# Patient Record
Sex: FEMALE | Race: WHITE
[De-identification: names, ages, dates, MRNs, and addresses within clinical notes are randomized per-mention and may not be internally consistent; named-entity substitution may affect disease eponyms.]

---

## 2017-07-17 ENCOUNTER — HOSPITAL ENCOUNTER (OUTPATIENT)
Dept: HOSPITAL 62 - RAD | Age: 72
End: 2017-07-17
Attending: INTERNAL MEDICINE
Payer: MEDICARE

## 2017-07-17 DIAGNOSIS — M25.552: Primary | ICD-10-CM

## 2017-07-17 NOTE — RADIOLOGY REPORT (SQ)
EXAM DESCRIPTION:  MRIRLJ WO



COMPLETED DATE/TIME:  7/17/2017 9:58 am



REASON FOR STUDY:  RIGHT HIP PAIN (M25.552)



COMPARISON:  No comparison imaging available.  Correlation with hip radiographs is still recommended 
if not previously performed.



TECHNIQUE:  Noncontrast multiplanar MR imaging.  Sequences include wide field of view pelvis and focu
sed hip of interest.  Fat sensitive, water sensitive, and cartilage sensitive sequences.

Specific hip of interest: Right



LIMITATIONS:  None.



FINDINGS:  MARROW SIGNAL: Normal, no evidence of replacement, occult fracture or suspicious bone lesi
on in the visualized lumbar spine, pelvis and proximal femurs.

SPECIFIC HIP OF INTEREST:  No effusion. Appropriate acetabular coverage. Normal sphericity of the fem
oral head.  No avascular necrosis or reactive marrow changes.  Inter trochanteric tiny lobulated hype
rintense T2 focus measures just at 1 cm and has no aggressive features.  Probable enchondroma or bony
fact.  Hyaline cartillage preserved and labrum intact as assessed.  No paralabral cyst.  No regional 
mass, bursitis or muscle tear.

OPPOSITE HIP: Normal.  No effusion or other significant finding on limited sequences.

REMAINDER OF THE OSSEOUS PELVIS: SI joints normal.  Symphasis pubis intact.  No Avulsion injury evide
nt.

INTRA- AND EXTRAPELVIC SOFT TISSUES: No intrapelvic mass or free fluid. Bladder normal.  No extrapelv
ic mass.  No inguinal hernia or adenopathy.



IMPRESSION:  1.  No acute or suspicious pelvic or right hip abnormality appreciated.  The hip looks r
elatively well preserved.  If not performed previously, radiographs of the right hip are still recomm
ended for evaluation.

## 2019-04-09 ENCOUNTER — HOSPITAL ENCOUNTER (OUTPATIENT)
Dept: HOSPITAL 62 - RAD | Age: 74
End: 2019-04-09
Attending: OTOLARYNGOLOGY
Payer: MEDICARE

## 2019-04-09 DIAGNOSIS — R59.1: Primary | ICD-10-CM

## 2019-04-09 PROCEDURE — 60100 BIOPSY OF THYROID: CPT

## 2019-04-09 PROCEDURE — 88305 TISSUE EXAM BY PATHOLOGIST: CPT

## 2019-04-09 NOTE — RADIOLOGY REPORT (SQ)
EXAM DESCRIPTION:  U/S BIOPSY THYROID



COMPLETED DATE/TIME:  4/9/2019 2:14 pm



REASON FOR STUDY:  LYMPHADENOPATHY (R59.1) R59.1  GENERALIZED ENLARGED LYMPH NODES



COMPARISON:   CT soft tissue neck 1/28/2019

Ultrasound soft tissue neck 3/25/2019



TECHNIQUE:  Scanning today demonstrates a persistent 1.5 x 1.4 x 0.6 cm left level IIa lymph node (wa
s 2.8 x 1.1 x 0.8 cm on outside neck ultrasound 3/25/2019).  This is the lymph node targeted for fine
 needle aspirate today.

The procedure was discussed with the patient and written informed consent obtained.  A timeout was pe
rformed to confirm the procedure and patient's identity.  The skin of the neck was prepped and draped
 in sterile fashion and 1.5 mL of 1% lidocaine administered for local anesthesia.  Under sonographic 
guidance, fine needle aspiration biopsy was performed of the left level IIa lymph node.

Two separate aspirations were performed.  Hemostasis was obtained with direct manual compression.  Th
ere were no immediate complications.



LIMITATIONS:  None.



FINDINGS:  PATHOLOGY: Pending.



IMPRESSION:  ULTRASOUND-GUIDED BIOPSY PERFORMED OF A PERSISTENT LEFT LEVEL IIA ENLARGED LYMPH NODE.  
PATHOLOGY PENDING AT THE TIME OF DICTATION.  NO IMMEDIATE POSTPROCEDURE COMPLICATION.



COMMENT:  Patient medication list reviewed: Yes- Quality ID# [id number]:Eligible professional attests to doc
umenting in the medical record they obtained, updated, or reviewed the patient's current medications.




TECHNICAL DOCUMENTATION:  JOB ID:  [id number]

 2011 [employer]- All Rights Reserved



Reading location - IP/workstation name: [email or web address]

## 2019-11-13 ENCOUNTER — HOSPITAL ENCOUNTER (OUTPATIENT)
Dept: HOSPITAL 62 - ER | Age: 74
Setting detail: OBSERVATION
LOS: 1 days | Discharge: HOME | End: 2019-11-14
Attending: INTERNAL MEDICINE | Admitting: INTERNAL MEDICINE
Payer: MEDICARE

## 2019-11-13 DIAGNOSIS — R53.1: ICD-10-CM

## 2019-11-13 DIAGNOSIS — Z91.19: ICD-10-CM

## 2019-11-13 DIAGNOSIS — R42: ICD-10-CM

## 2019-11-13 DIAGNOSIS — R06.03: ICD-10-CM

## 2019-11-13 DIAGNOSIS — E78.5: ICD-10-CM

## 2019-11-13 DIAGNOSIS — J43.9: ICD-10-CM

## 2019-11-13 DIAGNOSIS — R09.02: ICD-10-CM

## 2019-11-13 DIAGNOSIS — R05: ICD-10-CM

## 2019-11-13 DIAGNOSIS — Z87.891: ICD-10-CM

## 2019-11-13 DIAGNOSIS — E11.9: ICD-10-CM

## 2019-11-13 DIAGNOSIS — I10: ICD-10-CM

## 2019-11-13 DIAGNOSIS — R63.5: ICD-10-CM

## 2019-11-13 DIAGNOSIS — J81.1: Primary | ICD-10-CM

## 2019-11-13 LAB
ABSOLUTE LYMPHOCYTES# (MANUAL): 1.9 10^3/UL (ref 0.5–4.7)
ABSOLUTE MONOCYTES # (MANUAL): 0.3 10^3/UL (ref 0.1–1.4)
ADD MANUAL DIFF: YES
ALBUMIN SERPL-MCNC: 4.5 G/DL (ref 3.5–5)
ALP SERPL-CCNC: 58 U/L (ref 38–126)
ANION GAP SERPL CALC-SCNC: 12 MMOL/L (ref 5–19)
APPEARANCE UR: CLEAR
APTT PPP: YELLOW S
AST SERPL-CCNC: 28 U/L (ref 14–36)
BASOPHILS NFR BLD MANUAL: 2 % (ref 0–2)
BILIRUB DIRECT SERPL-MCNC: 0.2 MG/DL (ref 0–0.4)
BILIRUB SERPL-MCNC: 1 MG/DL (ref 0.2–1.3)
BILIRUB UR QL STRIP: NEGATIVE
BUN SERPL-MCNC: 10 MG/DL (ref 7–20)
CALCIUM: 9.4 MG/DL (ref 8.4–10.2)
CHLORIDE SERPL-SCNC: 104 MMOL/L (ref 98–107)
CO2 SERPL-SCNC: 28 MMOL/L (ref 22–30)
EOSINOPHIL NFR BLD MANUAL: 3 % (ref 0–6)
ERYTHROCYTE [DISTWIDTH] IN BLOOD BY AUTOMATED COUNT: 13.3 % (ref 11.5–14)
GLUCOSE SERPL-MCNC: 162 MG/DL (ref 75–110)
GLUCOSE UR STRIP-MCNC: NEGATIVE MG/DL
HCT VFR BLD CALC: 42.8 % (ref 36–47)
HGB BLD-MCNC: 15 G/DL (ref 12–15.5)
KETONES UR STRIP-MCNC: NEGATIVE MG/DL
MCH RBC QN AUTO: 31 PG (ref 27–33.4)
MCHC RBC AUTO-ENTMCNC: 34.9 G/DL (ref 32–36)
MCV RBC AUTO: 89 FL (ref 80–97)
MONOCYTES % (MANUAL): 3 % (ref 3–13)
NEUTS BAND NFR BLD MANUAL: 1 % (ref 3–5)
NITRITE UR QL STRIP: NEGATIVE
PH UR STRIP: 7 [PH] (ref 5–9)
PLATELET # BLD: 357 10^3/UL (ref 150–450)
PLATELET COMMENT: ADEQUATE
POLYCHROMASIA BLD QL SMEAR: SLIGHT
POTASSIUM SERPL-SCNC: 4.1 MMOL/L (ref 3.6–5)
PROT SERPL-MCNC: 7.9 G/DL (ref 6.3–8.2)
PROT UR STRIP-MCNC: NEGATIVE MG/DL
RBC # BLD AUTO: 4.84 10^6/UL (ref 3.72–5.28)
SEGMENTED NEUTROPHILS % (MAN): 69 % (ref 42–78)
SP GR UR STRIP: 1.01
TOTAL CELLS COUNTED BLD: 100
UROBILINOGEN UR-MCNC: 2 MG/DL (ref ?–2)
VARIANT LYMPHS NFR BLD MANUAL: 18 % (ref 13–45)
WBC # BLD AUTO: 8.8 10^3/UL (ref 4–10.5)

## 2019-11-13 PROCEDURE — 96375 TX/PRO/DX INJ NEW DRUG ADDON: CPT

## 2019-11-13 PROCEDURE — 81001 URINALYSIS AUTO W/SCOPE: CPT

## 2019-11-13 PROCEDURE — 99285 EMERGENCY DEPT VISIT HI MDM: CPT

## 2019-11-13 PROCEDURE — 71275 CT ANGIOGRAPHY CHEST: CPT

## 2019-11-13 PROCEDURE — 80048 BASIC METABOLIC PNL TOTAL CA: CPT

## 2019-11-13 PROCEDURE — 82962 GLUCOSE BLOOD TEST: CPT

## 2019-11-13 PROCEDURE — 84443 ASSAY THYROID STIM HORMONE: CPT

## 2019-11-13 PROCEDURE — 80053 COMPREHEN METABOLIC PANEL: CPT

## 2019-11-13 PROCEDURE — 94660 CPAP INITIATION&MGMT: CPT

## 2019-11-13 PROCEDURE — 83735 ASSAY OF MAGNESIUM: CPT

## 2019-11-13 PROCEDURE — 87086 URINE CULTURE/COLONY COUNT: CPT

## 2019-11-13 PROCEDURE — 70450 CT HEAD/BRAIN W/O DYE: CPT

## 2019-11-13 PROCEDURE — 96374 THER/PROPH/DIAG INJ IV PUSH: CPT

## 2019-11-13 PROCEDURE — G0378 HOSPITAL OBSERVATION PER HR: HCPCS

## 2019-11-13 PROCEDURE — 71046 X-RAY EXAM CHEST 2 VIEWS: CPT

## 2019-11-13 PROCEDURE — 83880 ASSAY OF NATRIURETIC PEPTIDE: CPT

## 2019-11-13 PROCEDURE — 93010 ELECTROCARDIOGRAM REPORT: CPT

## 2019-11-13 PROCEDURE — 93005 ELECTROCARDIOGRAM TRACING: CPT

## 2019-11-13 PROCEDURE — 85025 COMPLETE CBC W/AUTO DIFF WBC: CPT

## 2019-11-13 PROCEDURE — 36415 COLL VENOUS BLD VENIPUNCTURE: CPT

## 2019-11-13 PROCEDURE — 84484 ASSAY OF TROPONIN QUANT: CPT

## 2019-11-13 RX ADMIN — CEFTRIAXONE SCH MLS/HR: 2 INJECTION, SOLUTION INTRAVENOUS at 14:59

## 2019-11-13 RX ADMIN — FAMOTIDINE SCH: 20 TABLET, FILM COATED ORAL at 21:07

## 2019-11-13 RX ADMIN — INSULIN LISPRO SCH: 100 INJECTION, SOLUTION INTRAVENOUS; SUBCUTANEOUS at 18:47

## 2019-11-13 NOTE — RADIOLOGY REPORT (SQ)
EXAM DESCRIPTION:  CTA CHEST



COMPLETED DATE/TIME:  11/13/2019 2:19 pm



REASON FOR STUDY:  sob/hypoxia



COMPARISON:  None.



TECHNIQUE:  CT scan of the chest performed using helical scanning technique with dynamic intravenous 
contrast injection.  Images reviewed with lung, soft tissue and bone windows.  Reconstructed coronal 
and sagittal MPR images reviewed.

Additional 3 dimensional post-processing performed to develop Maximal Intensity Projection images (MI
P).  All images stored on PACS.

All CT scanners at this facility use dose modulation, iterative reconstruction, and/or weight based d
osing when appropriate to reduce radiation dose to as low as reasonably achievable (ALARA).

[facility]: Dose Right  [facility]: CareDose    [facility]: Dose Right    [facility]: Teradose 4D    [facility]: Smart Technologies



CONTRAST TYPE AND DOSE:  contrast/concentration: Isovue 350.00 mg/ml; Total Contrast Delivered: 61.0 
ml; Total Saline Delivered: 48.6 ml

Contrast bolus optimized for the pulmonary arteries. Not diagnostic for the aorta.



RENAL FUNCTION:  BUN 10, creatinine 0.57



RADIATION DOSE:  CT Rad equipment meets quality standard of care and radiation dose reduction techniq
ues were employed. CTDIvol: 6.6 - 16.6 mGy. DLP: 577 mGy-cm. .



LIMITATIONS:  None.



FINDINGS:  LUNGS AND PLEURA: Minimal basilar atelectasis.  No consolidation.  There is atelectasis po
ssibly pneumonia in the lingula.  No suspicious nodules.  No effusions.  Mild bilateral emphysematous
 changes.

AORTA AND GREAT VESSELS: No aneurysm.  Contrast bolus not optimized for the aorta.

HEART: No pericardial effusion. No significant coronary artery calcifications.

PULMONARY ARTERIES: No emboli visualized in the main pulmonary arteries or the segmental branches.

HILAR AND MEDIASTINAL STRUCTURES: No identified masses or abnormal nodes.

HARDWARE: None in the chest.

UPPER ABDOMEN: No significant findings.  Limited exam.

THYROID AND OTHER SOFT TISSUES: No masses.  No adenopathy.

BONES: No acute or significant finding.

3D MIPS: Confirm above findings.

OTHER: No other significant finding.



IMPRESSION:  Bibasilar atelectasis.  Lingular infiltrate either atelectasis or focal pneumonia.

Bilateral emphysematous changes.

No pulmonary emboli.



COMMENT:  Quality ID # 436: Final reports with documentation of one or more dose reduction techniques
 (e.g., Automated exposure control, adjustment of the mA and/or kV according to patient size, use of 
iterative reconstruction technique)



TECHNICAL DOCUMENTATION:  JOB ID:  [id number]

 2011 [employer]- All Rights Reserved



Reading location - IP/workstation name: [email or web address]

## 2019-11-13 NOTE — ER DOCUMENT REPORT
ED Dizziness/Weakness





- General


Chief Complaint: Dizziness


Stated Complaint: DIZZY


Time Seen by Provider: 11/13/19 09:47


Primary Care Provider: 


SID HERCULES MD [Primary Care Provider] - Follow up as needed


Mode of Arrival: Ambulatory


Information source: Patient


TRAVEL OUTSIDE OF THE [place] IN LAST 30 DAYS: No





- HPI


Notes: 





Patient presents with dizziness.  She states it was a sensation of the room 

spinning as an vertigo.  She states it came on suddenly and also went away 

suddenly.  Nothing made it worse.  She states that it did get better when she 

did a maneuver putting her head between her legs like her primary care doctor 

had told her to do.  Patient also states she has had some occasional shortness 

of breath and a mild dry cough at home recently.  Otherwise denies any pain.  No

fevers or rashes.  No recent trauma or falls.  She states she is supposed to 

wear oxygen at home but has never turned it on.  Symptoms had no known 

radiation.  They were moderate in intensity.  They were intermittent.





- Related Data


Allergies/Adverse Reactions: 


                                        





aspirin [Aspirin] Allergy (Verified 11/13/19 10:12)


   


Penicillins Allergy (Verified 11/13/19 10:12)


   











Past Medical History





- General


Information source: Patient





- Social History


Smoking Status: Former Smoker


Frequency of alcohol use: None


Drug Abuse: None


Family History: Reviewed & Not Pertinent


Patient has suicidal ideation: No


Patient has homicidal ideation: No





- Past Medical History


Cardiac Medical History: Reports: Hx Hypercholesterolemia, Hx Hypertension


Pulmonary Medical History: Reports: Hx COPD - emphysema


Endocrine Medical History: Reports: Hx Diabetes Mellitus Type 2


Renal/ Medical History: Reports: Hx Kidney Stones





- Immunizations


Hx Diphtheria, Pertussis, Tetanus Vaccination: No





Review of Systems





- Review of Systems


Constitutional: denies: Chills, Fever


Cardiovascular: denies: Chest pain, Palpitations


Respiratory: Cough, Short of breath


Neurological/Psychological: Weakness.  denies: Confusion


-: Yes All other systems reviewed and negative





Physical Exam





- Vital signs


Vitals: 





                                        











Temp Resp BP Pulse Ox


 


 97.9 F   17   187/98 H  94 


 


 11/13/19 09:46  11/13/19 09:46  11/13/19 09:46  11/13/19 09:46











Interpretation: Hypertensive, Hypoxic





- General


General appearance: Appears well, Alert


In distress: None





- HEENT


Head: Normocephalic, Atraumatic


Eyes: Normal


Pupils: PERRL





- Respiratory


Respiratory status: No respiratory distress


Chest status: Nontender


Breath sounds: Rhonchi


Chest palpation: Normal





- Cardiovascular


Rhythm: Regular


Heart sounds: Normal auscultation


Murmur: No





- Abdominal


Inspection: Normal


Distension: No distension


Bowel sounds: Normal


Tenderness: Nontender


Organomegaly: No organomegaly





- Back


Back: Normal, Nontender





- Extremities


General upper extremity: Normal inspection, Nontender, Normal color, Normal ROM,

 Normal temperature


General lower extremity: Normal inspection, Nontender, Normal color, Normal ROM,

 Normal temperature, Normal weight bearing.  No: Nara's sign





- Neurological


Neuro grossly intact: Yes


Cognition: Normal


Orientation: AAOx4


Horton Coma Scale Eye Opening: Spontaneous


Racquel Coma Scale Verbal: Oriented


Racquel Coma Scale Motor: Obeys Commands


Racquel Coma Scale Total: 15


Speech: Normal


Motor strength normal: LUE, RUE, LLE, RLE


Sensory: Normal





- Psychological


Associated symptoms: Normal affect, Normal mood





- Skin


Skin Temperature: Warm


Skin Moisture: Dry


Skin Color: Normal





Course





- Re-evaluation


Re-evalutation: 





11/13/19 14:58


Patient presented mainly with vertiginous symptoms.  The work-up for this was 

unremarkable.  I cannot find any acute neurological deficits.  Also patient 

happened to mention that she occasionally has some shortness of breath and it 

was noticed that she was 88% on room air.  She states she has been prescribed 

home oxygen but does not wear it.  She states she no longer smokes.  She states 

she is recently had a dry cough.  Due to the symptoms I ordered a CTA to 

evaluate for patient for pulmonary embolism.  After returning from the CT 

scanner she became acutely short of breath with sitting up and obtaining the 

tripod position.  Her saturations were approximate 75% on room air.  She had 

crackles throughout her lungs.  Her blood pressure was 190/100.  It appeared she

 may have some flash pulmonary edema.  I placed the patient on BiPAP with 

significant improvement.  CT shows a questionable pneumonia.  Due to the unclear

 etiology of patient's shortness of breath I am going to start her on some 

antibiotics and maintain her on BiPAP and have discussed admission with the 

hospitalist.





- Vital Signs


Vital signs: 





                                        











Temp Pulse Resp BP Pulse Ox


 


 98.7 F   82   17   131/59 H  90 L


 


 11/13/19 10:21  11/13/19 09:58  11/13/19 12:01  11/13/19 12:00  11/13/19 12:01














- Laboratory


Result Diagrams: 


                                 11/13/19 09:49





                                 11/13/19 09:49


Laboratory results interpreted by me: 





                                        











  11/13/19 11/13/19 11/13/19





  09:47 09:49 09:49


 


Band Neutrophils %   1 L 


 


Glucose    162 H


 


POC Glucose  159 H  


 


Urine Urobilinogen   


 


Ur Leukocyte Esterase   














  11/13/19





  12:53


 


Band Neutrophils % 


 


Glucose 


 


POC Glucose 


 


Urine Urobilinogen  2.0 H


 


Ur Leukocyte Esterase  TRACE H














- Diagnostic Test


Radiology reviewed: Image reviewed, Reports reviewed





- EKG Interpretation by Me


EKG shows normal: Sinus rhythm


Rate: Normal - 72


Rhythm: NSR


Axis/QRS: No: Right axis deviation, Left axis deviation





Discharge





- Discharge


Clinical Impression: 


 Respiratory distress, Hypoxia, Vertigo





Condition: Stable


Disposition: ADMITTED AS INPATIENT


Admitting Provider: Jamarcus (Hospitalist) - don day


Unit Admitted: Telemetry


Referrals: 


SID HERCULES MD [Primary Care Provider] - Follow up as needed

## 2019-11-13 NOTE — RADIOLOGY REPORT (SQ)
EXAM DESCRIPTION:  CHEST 2 VIEWS



COMPLETED DATE/TIME:  11/13/2019 10:39 am



REASON FOR STUDY:  rhonchi on exam



COMPARISON:  3/18/2013



EXAM PARAMETERS:  NUMBER OF VIEWS: two views

TECHNIQUE: Digital Frontal and Lateral radiographic views of the chest acquired.

RADIATION DOSE: NA

LIMITATIONS: none



FINDINGS:  LUNGS AND PLEURA: There is a small area of focal opacification the left base.

MEDIASTINUM AND HILAR STRUCTURES: No masses or contour abnormalities.

HEART AND VASCULAR STRUCTURES: Heart normal size.  No evidence for failure.

BONES: No acute findings.

HARDWARE: None in the chest.

OTHER: No other significant finding.



IMPRESSION:  Limited airspace disease in the left base, atelectasis versus pneumonia.



TECHNICAL DOCUMENTATION:  JOB ID:  [id number]

 2011 [employer]- All Rights Reserved



Reading location - IP/workstation name: [email or web address]

## 2019-11-13 NOTE — EKG REPORT
SEVERITY:- BORDERLINE ECG -

SINUS RHYTHM

BORDERLINE PROLONGED QT INTERVAL

:

Confirmed by: Esthela Henriquez MD 13-Nov-2019 11:08:37

## 2019-11-14 VITALS — SYSTOLIC BLOOD PRESSURE: 98 MMHG | DIASTOLIC BLOOD PRESSURE: 63 MMHG

## 2019-11-14 LAB
ADD MANUAL DIFF: NO
ANION GAP SERPL CALC-SCNC: 8 MMOL/L (ref 5–19)
BASOPHILS # BLD AUTO: 0.1 10^3/UL (ref 0–0.2)
BASOPHILS NFR BLD AUTO: 0.7 % (ref 0–2)
BUN SERPL-MCNC: 12 MG/DL (ref 7–20)
CALCIUM: 9.1 MG/DL (ref 8.4–10.2)
CHLORIDE SERPL-SCNC: 99 MMOL/L (ref 98–107)
CO2 SERPL-SCNC: 34 MMOL/L (ref 22–30)
EOSINOPHIL # BLD AUTO: 0.2 10^3/UL (ref 0–0.6)
EOSINOPHIL NFR BLD AUTO: 2.5 % (ref 0–6)
ERYTHROCYTE [DISTWIDTH] IN BLOOD BY AUTOMATED COUNT: 13.5 % (ref 11.5–14)
GLUCOSE SERPL-MCNC: 142 MG/DL (ref 75–110)
HCT VFR BLD CALC: 40.8 % (ref 36–47)
HGB BLD-MCNC: 14.3 G/DL (ref 12–15.5)
LYMPHOCYTES # BLD AUTO: 1.8 10^3/UL (ref 0.5–4.7)
LYMPHOCYTES NFR BLD AUTO: 20.2 % (ref 13–45)
MCH RBC QN AUTO: 31.2 PG (ref 27–33.4)
MCHC RBC AUTO-ENTMCNC: 35 G/DL (ref 32–36)
MCV RBC AUTO: 89 FL (ref 80–97)
MONOCYTES # BLD AUTO: 0.8 10^3/UL (ref 0.1–1.4)
MONOCYTES NFR BLD AUTO: 9.5 % (ref 3–13)
NEUTROPHILS # BLD AUTO: 5.9 10^3/UL (ref 1.7–8.2)
NEUTS SEG NFR BLD AUTO: 67.1 % (ref 42–78)
PLATELET # BLD: 357 10^3/UL (ref 150–450)
POTASSIUM SERPL-SCNC: 3.6 MMOL/L (ref 3.6–5)
RBC # BLD AUTO: 4.58 10^6/UL (ref 3.72–5.28)
TOTAL CELLS COUNTED % (AUTO): 100 %
WBC # BLD AUTO: 8.8 10^3/UL (ref 4–10.5)

## 2019-11-14 RX ADMIN — INSULIN LISPRO SCH: 100 INJECTION, SOLUTION INTRAVENOUS; SUBCUTANEOUS at 08:43

## 2019-11-14 RX ADMIN — FAMOTIDINE SCH MG: 20 TABLET, FILM COATED ORAL at 10:43

## 2019-11-14 RX ADMIN — CEFTRIAXONE SCH MLS/HR: 2 INJECTION, SOLUTION INTRAVENOUS at 10:43

## 2019-11-14 NOTE — PDOC DISCHARGE SUMMARY
Impression





- Admit/DC Date/PCP


Admission Date/Primary Care Provider: 


  11/13/19 15:14





  SID HERCULES MD





Discharge Date: 11/14/19





- Assessment


Summary: 


Will treat patient for pulmonary edema with Lasix 20 mg every 12 hours, 10 you 

the IV Rocephin for another 12 to 24 hours pending labs, order CT head scan 

without contrast for her vertigo.


Anticipate discharge either tomorrow or Friday.  May or may not be treated as an

outpatient for pneumonia





- Additional Information


Resuscitation Status: Full Code


Referrals: 


SID HERCULES MD [Primary Care Provider] - Follow up as needed


Home Medications: 








Fluticasone/Umeclidin/Vilanter [Trelegy 100-62.5-25 Mcg Ellipta 14 Dose/Dpi] 1 

puff IH DAILY 11/13/19 


Furosemide [Lasix 40 mg Tablet] 40 mg PO QAM 11/13/19 


Glipizide [Glipizide Xl] 5 mg PO DAILY 11/13/19 


Rosuvastatin Calcium [Crestor 5 mg Tablet] 5 mg PO DAILY 11/13/19 











History of Present Illiness


History of Present Illness: 


IEVTT MIRANDA is a 74 year old female








Physical Exam


Vital Signs: 





                                        











Temp Pulse Resp BP Pulse Ox


 


 97.4 F   85   16   126/56 H  92 


 


 11/14/19 08:10  11/14/19 09:47  11/14/19 09:47  11/14/19 08:10  11/14/19 09:47








                                 Intake & Output











 11/12/19 11/13/19 11/14/19





 11:59 11:59 11:59


 


Intake Total   730


 


Output Total   3


 


Balance   727


 


Weight  81.647 kg 82.2 kg














Results


Laboratory Results: 





                                        











WBC  8.8 10^3/uL (4.0-10.5)   11/14/19  05:15    


 


RBC  4.58 10^6/uL (3.72-5.28)   11/14/19  05:15    


 


Hgb  14.3 g/dL (12.0-15.5)   11/14/19  05:15    


 


Hct  40.8 % (36.0-47.0)   11/14/19  05:15    


 


MCV  89 fl (80-97)   11/14/19  05:15    


 


MCH  31.2 pg (27.0-33.4)   11/14/19  05:15    


 


MCHC  35.0 g/dL (32.0-36.0)   11/14/19  05:15    


 


RDW  13.5 % (11.5-14.0)   11/14/19  05:15    


 


Plt Count  357 10^3/uL (150-450)   11/14/19  05:15    


 


Lymph % (Auto)  20.2 % (13-45)   11/14/19  05:15    


 


Mono % (Auto)  9.5 % (3-13)   11/14/19  05:15    


 


Eos % (Auto)  2.5 % (0-6)   11/14/19  05:15    


 


Baso % (Auto)  0.7 % (0-2)   11/14/19  05:15    


 


Absolute Neuts (auto)  5.9 10^3/uL (1.7-8.2)   11/14/19  05:15    


 


Absolute Lymphs (auto)  1.8 10^3/uL (0.5-4.7)   11/14/19  05:15    


 


Absolute Monos (auto)  0.8 10^3/uL (0.1-1.4)   11/14/19  05:15    


 


Absolute Eos (auto)  0.2 10^3/uL (0.0-0.6)   11/14/19  05:15    


 


Absolute Basos (auto)  0.1 10^3/uL (0.0-0.2)   11/14/19  05:15    


 


Total Counted  100   11/13/19  09:49    


 


Seg Neutrophils %  67.1 % (42-78)   11/14/19  05:15    


 


Seg Neuts % (Manual)  69 % (42-78)   11/13/19  09:49    


 


Band Neutrophils %  1 % (3-5)  L  11/13/19  09:49    


 


Lymphocytes % (Manual)  18 % (13-45)   11/13/19  09:49    


 


Atypical Lymphs %  4 % (0)   11/13/19  09:49    


 


Monocytes % (Manual)  3 % (3-13)   11/13/19  09:49    


 


Eosinophils % (Manual)  3 % (0-6)   11/13/19  09:49    


 


Basophils % (Manual)  2 % (0-2)   11/13/19  09:49    


 


Abs Neuts (Manual)  6.2 10^3/uL (1.7-8.2)   11/13/19  09:49    


 


Abs Lymphs (Manual)  1.9 10^3/uL (0.5-4.7)   11/13/19  09:49    


 


Abs Monocytes (Manual)  0.3 10^3/uL (0.1-1.4)   11/13/19  09:49    


 


Absolute Eos (Manual)  0.3 10^3/uL (0.0-0.6)   11/13/19  09:49    


 


Abs Basophils (Manual)  0.2 10^3/uL (0.0-0.2)   11/13/19  09:49    


 


Platelet Comment  ADEQUATE   11/13/19  09:49    


 


Polychromasia  SLIGHT   11/13/19  09:49    


 


Sodium  141.4 mmol/L (137-145)   11/14/19  05:15    


 


Potassium  3.6 mmol/L (3.6-5.0)   11/14/19  05:15    


 


Chloride  99 mmol/L ()   11/14/19  05:15    


 


Carbon Dioxide  34 mmol/L (22-30)  H  11/14/19  05:15    


 


Anion Gap  8  (5-19)   11/14/19  05:15    


 


BUN  12 mg/dL (7-20)   11/14/19  05:15    


 


Creatinine  0.68 mg/dL (0.52-1.25)   11/14/19  05:15    


 


Est GFR ( Amer)  > 60  (>60)   11/14/19  05:15    


 


Est GFR (MDRD) Non-Af  > 60  (>60)   11/14/19  05:15    


 


Glucose  142 mg/dL ()  H  11/14/19  05:15    


 


POC Glucose  155 mg/dL ()  H  11/14/19  10:50    


 


Calcium  9.1 mg/dL (8.4-10.2)   11/14/19  05:15    


 


Magnesium  1.6 mg/dL (1.6-2.3)   11/14/19  05:15    


 


Total Bilirubin  1.0 mg/dL (0.2-1.3)   11/13/19  09:49    


 


Direct Bilirubin  0.2 mg/dL (0.0-0.4)   11/13/19  09:49    


 


Neonat Total Bilirubin  Not Reportable   11/13/19  09:49    


 


Neonat Direct Bilirubin  Not Reportable   11/13/19  09:49    


 


Neonat Indirect Bili  Not Reportable   11/13/19  09:49    


 


AST  28 U/L (14-36)   11/13/19  09:49    


 


ALT  16 U/L (<35)   11/13/19  09:49    


 


Alkaline Phosphatase  58 U/L ()   11/13/19  09:49    


 


Troponin I  < 0.012 ng/mL  11/13/19  09:49    


 


NT-Pro-B Natriuret Pep  2640 pg/mL (<450)  H  11/13/19  18:00    


 


Total Protein  7.9 g/dL (6.3-8.2)   11/13/19  09:49    


 


Albumin  4.5 g/dL (3.5-5.0)   11/13/19  09:49    


 


TSH  4.67 uIU/mL (0.47-4.68)   11/13/19  10:10    


 


Urine Color  YELLOW   11/13/19  12:53    


 


Urine Appearance  CLEAR   11/13/19  12:53    


 


Urine pH  7.0  (5.0-9.0)   11/13/19  12:53    


 


Ur Specific Gravity  1.010   11/13/19  12:53    


 


Urine Protein  NEGATIVE mg/dL (NEGATIVE)   11/13/19  12:53    


 


Urine Glucose (UA)  NEGATIVE mg/dL (NEGATIVE)   11/13/19  12:53    


 


Urine Ketones  NEGATIVE mg/dL (NEGATIVE)   11/13/19  12:53    


 


Urine Blood  NEGATIVE  (NEGATIVE)   11/13/19  12:53    


 


Urine Nitrite  NEGATIVE  (NEGATIVE)   11/13/19  12:53    


 


Urine Bilirubin  NEGATIVE  (NEGATIVE)   11/13/19  12:53    


 


Urine Urobilinogen  2.0 mg/dL (<2.0)  H  11/13/19  12:53    


 


Ur Leukocyte Esterase  TRACE  (NEGATIVE)  H  11/13/19  12:53    


 


Urine WBC (Auto)  1 /HPF  11/13/19  12:53    


 


Squamous Epi Cells Auto  2 /HPF  11/13/19  12:53    


 


Urine Ascorbic Acid  NEGATIVE  (NEGATIVE)   11/13/19  12:53    








                                        











  11/13/19 11/13/19





  09:49 18:00


 


Troponin I  < 0.012 


 


NT-Pro-B Natriuret Pep   2640 H











Impressions: 





                                        





Chest X-Ray  11/13/19 10:10


IMPRESSION:  Limited airspace disease in the left base, atelectasis versus 

pneumonia.


 








Chest/Abdomen CTA  11/13/19 13:17


IMPRESSION:  Bibasilar atelectasis.  Lingular infiltrate either atelectasis or 

focal pneumonia.


Bilateral emphysematous changes.


No pulmonary emboli.


 








Head CT  11/14/19 08:06


IMPRESSION:  NORMAL BRAIN CT WITHOUT CONTRAST.


EVIDENCE OF ACUTE STROKE: NO.


 














Plan


Critical Time: 30

## 2019-11-14 NOTE — RADIOLOGY REPORT (SQ)
EXAM DESCRIPTION:  CT HEAD WITHOUT



COMPLETED DATE/TIME:  11/14/2019 8:44 am



REASON FOR STUDY:  vertigo I50.21  ACUTE SYSTOLIC (CONGESTIVE) HEART FAILURE R42  DIZZINESS AND GIDDI
NESS



COMPARISON:  None.



TECHNIQUE:  Axial images acquired through the brain without intravenous contrast.  Images reviewed wi
th bone, brain and subdural windows.  Additional sagittal and coronal reconstructions were generated.
 Images stored on PACS.

All CT scanners at this facility use dose modulation, iterative reconstruction, and/or weight based d
osing when appropriate to reduce radiation dose to as low as reasonably achievable (ALARA).

[facility]: Dose Right  [facility]: CareDose    [facility]: Dose Right    [facility]: Teradose 4D    [facility]: Smart Technologies



RADIATION DOSE:  CT Rad equipment meets quality standard of care and radiation dose reduction techniq
ues were employed. CTDIvol: 48.6 mGy. DLP: 905 mGy-cm. mGy.



LIMITATIONS:  None.



FINDINGS:  VENTRICLES: Normal size and contour.

CEREBRUM: No masses.  No hemorrhage.  No midline shift.  No evidence for acute infarction. Normal gra
y/white matter differentiation. No areas of low density in the white matter.

CEREBELLUM: No masses.  No hemorrhage.  No alteration of density.  No evidence for acute infarction.

EXTRAAXIAL SPACES: No fluid collections.  No masses.

ORBITS AND GLOBE: No intra- or extraconal masses.  Normal contour of globe without masses.

CALVARIUM: No fracture.

PARANASAL SINUSES: No fluid or mucosal thickening.

SOFT TISSUES: No mass or hematoma.

OTHER: No other significant finding.



IMPRESSION:  NORMAL BRAIN CT WITHOUT CONTRAST.

EVIDENCE OF ACUTE STROKE: NO.



COMMENT:  Quality ID # 436: Final reports with documentation of one or more dose reduction techniques
 (e.g., Automated exposure control, adjustment of the mA and/or kV according to patient size, use of 
iterative reconstruction technique)



TECHNICAL DOCUMENTATION:  JOB ID:  [id number]

 2011 [employer]- All Rights Reserved



Reading location - IP/workstation name: [email or web address]

## 2019-11-18 NOTE — PDOC DISCHARGE SUMMARY
Impression





- Admit/DC Date/PCP


Admission Date/Primary Care Provider: 


  11/13/19 15:14





  SID HERCULES MD





Discharge Date: 11/14/19





- Discharge Diagnosis


(1) Pulmonary edema


Is this a current diagnosis for this admission?: Yes   





(2) Hypertension


Is this a current diagnosis for this admission?: Yes   





(3) Diabetes


Is this a current diagnosis for this admission?: Yes   





(4) COPD (chronic obstructive pulmonary disease)


Is this a current diagnosis for this admission?: Yes   





(5) Hypoxia


Is this a current diagnosis for this admission?: Yes   





(6) Respiratory distress


Is this a current diagnosis for this admission?: Yes   





(7) Vertigo


Is this a current diagnosis for this admission?: Yes   





- Assessment


Summary: 


Will treat patient for pulmonary edema with Lasix 20 mg every 12 hours, 10 you 

the IV Rocephin for another 12 to 24 hours pending labs, order CT head scan 

without contrast for her vertigo.


Anticipate discharge either tomorrow or Friday.  May or may not be treated as an

outpatient for pneumonia





11/14/2019





She was discharged home today


No clinical indication of pneumonia


CT head scan is unremarkable no evidence of acute stroke


Patient was treated with Lasix 20 mg every 12 hours for mild pulmonary edema


Patient was medically stable at time of discharge








- Additional Information


Resuscitation Status: Full Code


Discharge Diet: Diabetic


Discharge Activity: Balance Activity w/Rest


Referrals: 


SID HERCULES MD [Primary Care Provider] - Follow up as needed (PER 

DOCTOR'S OFFICE THEY WILL CONTACT THE PATIENT.)


Home Medications: 








Fluticasone/Umeclidin/Vilanter [Trelegy 100-62.5-25 Mcg Ellipta 14 Dose/Dpi] 1 

puff IH DAILY 11/13/19 


Furosemide [Lasix 40 mg Tablet] 40 mg PO QAM 11/13/19 


Glipizide [Glipizide Xl] 5 mg PO DAILY 11/13/19 


Rosuvastatin Calcium [Crestor 5 mg Tablet] 5 mg PO DAILY 11/13/19 











History of Present Illiness


History of Present Illness: 


IVETT MIRANDA is a 74 year old female who came into the emergency room for an 

episode of vertigo this morning.  Patient states she has had 2 or 3 of these in 

fact is seeing her primary care provider for this and was given a prescription 

for for what sounds like meclizine.  


Patient came into the emergency room for this vertigo-like symptom.  She states 

that the room is spinning.  Symptoms only lasted about 5 minutes and since then 

she is been feeling well.





She came to the emergency room she was somewhat hypoxic with sat of 88 or 8089 

on room air, supposed to be using home O2 as needed but she does not.  ER 

provider sent her for a CT angiogram to make sure she did not have a pulmonary 

embolism while she was in the scanner she developed what sounds to be flash 

pulmonary edema.  Patient had crackles and was noticeably short of breath with 

an O2 sat of 77% back in the ER.





Patient confesses that over the last 2 months she is gained 20 pounds since 

starting a new medication, trilogy.  She also admits that she not take her 

diuretic on a daily basis as she is supposed to. 


Chest x-ray was unremarkable but CT scan does show possible pneumonia.  Patient 

will be put in the hospital for observation status and started on antibiotics, 

ever I am not convinced that this to be treated in light of her symptoms, or 

lack thereof.  No fever no chills no white count no sputum production.





Will order a CT without contrast because of her vertigo symptoms








Physical Exam


Vital Signs: 


                                        











Temp Pulse Resp BP Pulse Ox


 


 97.4 F   85   16   130/56 H  92 


 


 11/14/19 12:51  11/14/19 12:51  11/14/19 12:51  11/14/19 12:51  11/14/19 12:51














Results


Laboratory Results: 


                                        











WBC  8.8 10^3/uL (4.0-10.5)   11/14/19  05:15    


 


RBC  4.58 10^6/uL (3.72-5.28)   11/14/19  05:15    


 


Hgb  14.3 g/dL (12.0-15.5)   11/14/19  05:15    


 


Hct  40.8 % (36.0-47.0)   11/14/19  05:15    


 


MCV  89 fl (80-97)   11/14/19  05:15    


 


MCH  31.2 pg (27.0-33.4)   11/14/19  05:15    


 


MCHC  35.0 g/dL (32.0-36.0)   11/14/19  05:15    


 


RDW  13.5 % (11.5-14.0)   11/14/19  05:15    


 


Plt Count  357 10^3/uL (150-450)   11/14/19  05:15    


 


Lymph % (Auto)  20.2 % (13-45)   11/14/19  05:15    


 


Mono % (Auto)  9.5 % (3-13)   11/14/19  05:15    


 


Eos % (Auto)  2.5 % (0-6)   11/14/19  05:15    


 


Baso % (Auto)  0.7 % (0-2)   11/14/19  05:15    


 


Absolute Neuts (auto)  5.9 10^3/uL (1.7-8.2)   11/14/19  05:15    


 


Absolute Lymphs (auto)  1.8 10^3/uL (0.5-4.7)   11/14/19  05:15    


 


Absolute Monos (auto)  0.8 10^3/uL (0.1-1.4)   11/14/19  05:15    


 


Absolute Eos (auto)  0.2 10^3/uL (0.0-0.6)   11/14/19  05:15    


 


Absolute Basos (auto)  0.1 10^3/uL (0.0-0.2)   11/14/19  05:15    


 


Total Counted  100   11/13/19  09:49    


 


Seg Neutrophils %  67.1 % (42-78)   11/14/19  05:15    


 


Seg Neuts % (Manual)  69 % (42-78)   11/13/19  09:49    


 


Band Neutrophils %  1 % (3-5)  L  11/13/19  09:49    


 


Lymphocytes % (Manual)  18 % (13-45)   11/13/19  09:49    


 


Atypical Lymphs %  4 % (0)   11/13/19  09:49    


 


Monocytes % (Manual)  3 % (3-13)   11/13/19  09:49    


 


Eosinophils % (Manual)  3 % (0-6)   11/13/19  09:49    


 


Basophils % (Manual)  2 % (0-2)   11/13/19  09:49    


 


Abs Neuts (Manual)  6.2 10^3/uL (1.7-8.2)   11/13/19  09:49    


 


Abs Lymphs (Manual)  1.9 10^3/uL (0.5-4.7)   11/13/19  09:49    


 


Abs Monocytes (Manual)  0.3 10^3/uL (0.1-1.4)   11/13/19  09:49    


 


Absolute Eos (Manual)  0.3 10^3/uL (0.0-0.6)   11/13/19  09:49    


 


Abs Basophils (Manual)  0.2 10^3/uL (0.0-0.2)   11/13/19  09:49    


 


Platelet Comment  ADEQUATE   11/13/19  09:49    


 


Polychromasia  SLIGHT   11/13/19  09:49    


 


Sodium  141.4 mmol/L (137-145)   11/14/19  05:15    


 


Potassium  3.6 mmol/L (3.6-5.0)   11/14/19  05:15    


 


Chloride  99 mmol/L ()   11/14/19  05:15    


 


Carbon Dioxide  34 mmol/L (22-30)  H  11/14/19  05:15    


 


Anion Gap  8  (5-19)   11/14/19  05:15    


 


BUN  12 mg/dL (7-20)   11/14/19  05:15    


 


Creatinine  0.68 mg/dL (0.52-1.25)   11/14/19  05:15    


 


Est GFR ( Amer)  > 60  (>60)   11/14/19  05:15    


 


Est GFR (MDRD) Non-Af  > 60  (>60)   11/14/19  05:15    


 


Glucose  142 mg/dL ()  H  11/14/19  05:15    


 


POC Glucose  155 mg/dL ()  H  11/14/19  10:50    


 


Calcium  9.1 mg/dL (8.4-10.2)   11/14/19  05:15    


 


Magnesium  1.6 mg/dL (1.6-2.3)   11/14/19  05:15    


 


Total Bilirubin  1.0 mg/dL (0.2-1.3)   11/13/19  09:49    


 


Direct Bilirubin  0.2 mg/dL (0.0-0.4)   11/13/19  09:49    


 


Neonat Total Bilirubin  Not Reportable   11/13/19  09:49    


 


Neonat Direct Bilirubin  Not Reportable   11/13/19  09:49    


 


Neonat Indirect Bili  Not Reportable   11/13/19  09:49    


 


AST  28 U/L (14-36)   11/13/19  09:49    


 


ALT  16 U/L (<35)   11/13/19  09:49    


 


Alkaline Phosphatase  58 U/L ()   11/13/19  09:49    


 


Troponin I  < 0.012 ng/mL  11/13/19  09:49    


 


NT-Pro-B Natriuret Pep  2640 pg/mL (<450)  H  11/13/19  18:00    


 


Total Protein  7.9 g/dL (6.3-8.2)   11/13/19  09:49    


 


Albumin  4.5 g/dL (3.5-5.0)   11/13/19  09:49    


 


TSH  4.67 uIU/mL (0.47-4.68)   11/13/19  10:10    


 


Urine Color  YELLOW   11/13/19  12:53    


 


Urine Appearance  CLEAR   11/13/19  12:53    


 


Urine pH  7.0  (5.0-9.0)   11/13/19  12:53    


 


Ur Specific Gravity  1.010   11/13/19  12:53    


 


Urine Protein  NEGATIVE mg/dL (NEGATIVE)   11/13/19  12:53    


 


Urine Glucose (UA)  NEGATIVE mg/dL (NEGATIVE)   11/13/19  12:53    


 


Urine Ketones  NEGATIVE mg/dL (NEGATIVE)   11/13/19  12:53    


 


Urine Blood  NEGATIVE  (NEGATIVE)   11/13/19  12:53    


 


Urine Nitrite  NEGATIVE  (NEGATIVE)   11/13/19  12:53    


 


Urine Bilirubin  NEGATIVE  (NEGATIVE)   11/13/19  12:53    


 


Urine Urobilinogen  2.0 mg/dL (<2.0)  H  11/13/19  12:53    


 


Ur Leukocyte Esterase  TRACE  (NEGATIVE)  H  11/13/19  12:53    


 


Urine WBC (Auto)  1 /HPF  11/13/19  12:53    


 


Squamous Epi Cells Auto  2 /HPF  11/13/19  12:53    


 


Urine Ascorbic Acid  NEGATIVE  (NEGATIVE)   11/13/19  12:53    








                                        











  11/13/19 11/13/19





  09:49 18:00


 


Troponin I  < 0.012 


 


NT-Pro-B Natriuret Pep   2640 H











Impressions: 


                                        





Chest X-Ray  11/13/19 10:10


IMPRESSION:  Limited airspace disease in the left base, atelectasis versus 

pneumonia.


 








Chest/Abdomen CTA  11/13/19 13:17


IMPRESSION:  Bibasilar atelectasis.  Lingular infiltrate either atelectasis or 

focal pneumonia.


Bilateral emphysematous changes.


No pulmonary emboli.


 








Head CT  11/14/19 08:06


IMPRESSION:  NORMAL BRAIN CT WITHOUT CONTRAST.


EVIDENCE OF ACUTE STROKE: NO.


 














Stroke


Is this a Stroke Patient?: No





Acute Heart Failure





- **


Is this a Heart Failure Patient?: No